# Patient Record
Sex: MALE
[De-identification: names, ages, dates, MRNs, and addresses within clinical notes are randomized per-mention and may not be internally consistent; named-entity substitution may affect disease eponyms.]

---

## 2017-03-13 PROBLEM — Z00.00 ENCOUNTER FOR PREVENTIVE HEALTH EXAMINATION: Status: ACTIVE | Noted: 2017-03-13

## 2017-04-12 ENCOUNTER — APPOINTMENT (OUTPATIENT)
Dept: ENDOCRINOLOGY | Facility: CLINIC | Age: 47
End: 2017-04-12

## 2019-04-15 ENCOUNTER — APPOINTMENT (OUTPATIENT)
Dept: COLORECTAL SURGERY | Facility: CLINIC | Age: 49
End: 2019-04-15
Payer: MEDICAID

## 2019-04-29 ENCOUNTER — APPOINTMENT (OUTPATIENT)
Dept: COLORECTAL SURGERY | Facility: CLINIC | Age: 49
End: 2019-04-29
Payer: MEDICAID

## 2019-04-29 VITALS
HEART RATE: 114 BPM | TEMPERATURE: 98.2 F | SYSTOLIC BLOOD PRESSURE: 150 MMHG | WEIGHT: 226 LBS | BODY MASS INDEX: 35.47 KG/M2 | HEIGHT: 67 IN | DIASTOLIC BLOOD PRESSURE: 96 MMHG

## 2019-04-29 DIAGNOSIS — J45.20 MILD INTERMITTENT ASTHMA, UNCOMPLICATED: ICD-10-CM

## 2019-04-29 DIAGNOSIS — K62.5 HEMORRHAGE OF ANUS AND RECTUM: ICD-10-CM

## 2019-04-29 DIAGNOSIS — I10 ESSENTIAL (PRIMARY) HYPERTENSION: ICD-10-CM

## 2019-04-29 DIAGNOSIS — J45.909 UNSPECIFIED ASTHMA, UNCOMPLICATED: ICD-10-CM

## 2019-04-29 DIAGNOSIS — Z55.0 ILLITERACY AND LOW-LEVEL LITERACY: ICD-10-CM

## 2019-04-29 DIAGNOSIS — E11.9 TYPE 2 DIABETES MELLITUS W/OUT COMPLICATIONS: ICD-10-CM

## 2019-04-29 PROCEDURE — 99214 OFFICE O/P EST MOD 30 MIN: CPT | Mod: 25

## 2019-04-29 PROCEDURE — 46600 DIAGNOSTIC ANOSCOPY SPX: CPT

## 2019-04-29 RX ORDER — METFORMIN HYDROCHLORIDE 500 MG/1
500 TABLET, COATED ORAL
Refills: 0 | Status: ACTIVE | COMMUNITY

## 2019-04-29 RX ORDER — ALBUTEROL SULFATE 90 UG/1
108 (90 BASE) AEROSOL, METERED RESPIRATORY (INHALATION)
Refills: 0 | Status: ACTIVE | COMMUNITY

## 2019-04-29 SDOH — EDUCATIONAL SECURITY - EDUCATION ATTAINMENT: ILITERACY AND LOW LEVEL LITERACY: Z55.0

## 2019-04-29 NOTE — ASSESSMENT
[FreeTextEntry1] : Advised colonoscopy for assessment of recent change in bowels with rectal bleeding.\par \par I have reviewed with the patient in detail the role of colonoscopy any evaluation of possible colon polyps and cancer. The risks, alternatives and benefits of the procedure were detailed and client including but not limited to risk of bleeding, risk of infection, risk of perforation and requiring further and potential surgery, and other secondary complications of the procedure. The colonoscopy will be performed to evaluate for possible polyps and cancer and if possible a polypectomy or removal of the polyp will be performed. Bowel preparation instructions were reviewed. The need for long-term surveillance based on findings of the colonoscopy were discussed.\par The patient consents to the planned procedure.\par \par I have reviewed with the patient the clinical and natural history of human papilloma virus and its relationship to the anus. The risks and associated consequences including sexual transmission, anal warts, anal dysplasia, and the risk of anal cancer have been outlined. The need for long-term surveillance and followup has been detailed. The treatment options including high resolution anoscopy and its associated risk of recurrence and post procedure stricture and pain compared to continued close surveillance and monitoring were reviewed. The patient wishes to proceed with surveillance and management of identified visible/palpable lesions as identified.\par \par I have personally spent 30 minutes with the patient with greater than 50% of the time counseling cord in the patient's care.\par

## 2019-04-29 NOTE — PHYSICAL EXAM
[None] : no anal fissures seen [Wart] : no warts [de-identified] : Anal pap performed [FreeTextEntry1] : A lighted anoscope was passed into the anal canal and the entire anal mucosal surface was inspected..  THe findings revealed moderate internal hemorrhoids. No masses or lesions were identified.\par \par

## 2019-04-29 NOTE — HISTORY OF PRESENT ILLNESS
[FreeTextEntry1] : 49 yo M presents for follow up\par \par Reports last seen at [facility] approx 1 year ago, reports history of hemorrhoids and follow up every 6 mo-1 year, reports has had in office excision and topical application to treat ?lesions\par Pt unsure what he was treated for in the past\par \par Complains of rectal bleeding.  No  pain\par Occasional itching\par Reports improvement with sitz baths\par BH: daily, with occasional straining and constipation\par Reports low intake dietary fiber and water intake. High intake of fast food and diabetes\par HIV (-), MSM, anal insertive sex. Denies hx of STIs\par \par Denies [facility] colorectal cancer\par Never had a colonoscopy\par No ASA/NSAIDs in last week

## 2019-05-01 LAB — ANAL PAP CYTOLOGY: NORMAL

## 2019-06-28 ENCOUNTER — APPOINTMENT (OUTPATIENT)
Dept: COLORECTAL SURGERY | Facility: HOSPITAL | Age: 49
End: 2019-06-28

## 2020-10-02 ENCOUNTER — APPOINTMENT (OUTPATIENT)
Dept: COLORECTAL SURGERY | Facility: CLINIC | Age: 50
End: 2020-10-02
Payer: MEDICAID

## 2020-10-02 VITALS
WEIGHT: 219 LBS | HEIGHT: 67 IN | DIASTOLIC BLOOD PRESSURE: 92 MMHG | TEMPERATURE: 97.1 F | HEART RATE: 114 BPM | SYSTOLIC BLOOD PRESSURE: 152 MMHG | BODY MASS INDEX: 34.37 KG/M2

## 2020-10-02 PROCEDURE — 46600 DIAGNOSTIC ANOSCOPY SPX: CPT

## 2020-10-02 PROCEDURE — 99214 OFFICE O/P EST MOD 30 MIN: CPT | Mod: 25

## 2020-10-02 NOTE — PHYSICAL EXAM
[Wart] : no warts [Normal] : was normal [None] : there was no rectal mass  [de-identified] : Anal pap performed [FreeTextEntry1] : A lighted anoscope was passed into the anal canal and the entire anal mucosal surface was inspected..  The findings revealed moderate internal hemorrhoids. No masses or lesions were identified.\par \par

## 2020-10-02 NOTE — HISTORY OF PRESENT ILLNESS
[FreeTextEntry1] : 49 y/o M presents for f/u anal dysplasia\par H/o in office fulgurations (?) completed in the past\par Last seen in the office 4/29/19, anal pap smear was (-)\par Denies pain, itching, bleeding\par Reports small bump that has been present for years\par BH:Daily\par Admits to needing to strain with every BM to the point where he sometimes feels like he may pass out. Denies syncopal episodes \par Admits to limited dietary fiber and water intake\par Using a fiber supplement once every 3-4 months\par MSM, (+) anal receptive sex\par HIV (-)\par No ASA/NSAIDs last 7 days\par \par EGD and colonoscopy completed 7/8/19 with Dr. Anderson \par - stool in the entire examined colon\par - diverticulosis in the sigmoid colon\par

## 2020-10-07 LAB — ANAL PAP CYTOLOGY: NORMAL

## 2023-11-06 ENCOUNTER — APPOINTMENT (OUTPATIENT)
Dept: COLORECTAL SURGERY | Facility: CLINIC | Age: 53
End: 2023-11-06
Payer: MEDICAID

## 2023-11-06 ENCOUNTER — NON-APPOINTMENT (OUTPATIENT)
Age: 53
End: 2023-11-06

## 2023-11-06 VITALS
SYSTOLIC BLOOD PRESSURE: 130 MMHG | WEIGHT: 218 LBS | TEMPERATURE: 97.8 F | BODY MASS INDEX: 34.21 KG/M2 | HEIGHT: 67 IN | DIASTOLIC BLOOD PRESSURE: 89 MMHG | HEART RATE: 99 BPM

## 2023-11-06 DIAGNOSIS — Z78.9 OTHER SPECIFIED HEALTH STATUS: ICD-10-CM

## 2023-11-06 DIAGNOSIS — K62.82 DYSPLASIA OF ANUS: ICD-10-CM

## 2023-11-06 PROCEDURE — 99203 OFFICE O/P NEW LOW 30 MIN: CPT | Mod: 25

## 2023-11-06 PROCEDURE — 46600 DIAGNOSTIC ANOSCOPY SPX: CPT

## 2023-11-22 ENCOUNTER — NON-APPOINTMENT (OUTPATIENT)
Age: 53
End: 2023-11-22

## 2023-11-22 LAB — ANAL PAP CYTOLOGY: NORMAL

## 2025-06-30 ENCOUNTER — EMERGENCY (EMERGENCY)
Facility: HOSPITAL | Age: 55
LOS: 1 days | End: 2025-06-30
Admitting: EMERGENCY MEDICINE
Payer: MEDICAID

## 2025-06-30 VITALS
OXYGEN SATURATION: 96 % | RESPIRATION RATE: 18 BRPM | WEIGHT: 220.02 LBS | DIASTOLIC BLOOD PRESSURE: 94 MMHG | SYSTOLIC BLOOD PRESSURE: 150 MMHG | HEART RATE: 100 BPM | TEMPERATURE: 99 F

## 2025-06-30 PROCEDURE — 99284 EMERGENCY DEPT VISIT MOD MDM: CPT | Mod: 25

## 2025-06-30 PROCEDURE — 10061 I&D ABSCESS COMP/MULTIPLE: CPT

## 2025-06-30 RX ORDER — NAPROXEN SODIUM 275 MG
500 TABLET ORAL ONCE
Refills: 0 | Status: COMPLETED | OUTPATIENT
Start: 2025-06-30 | End: 2025-06-30

## 2025-06-30 RX ADMIN — Medication 500 MILLIGRAM(S): at 22:48

## 2025-06-30 NOTE — ED PROVIDER NOTE - PATIENT PORTAL LINK FT
You can access the FollowMyHealth Patient Portal offered by [facility] by registering at the following website: [email or web address]. By joining [facility]’s FollowMyHealth portal, you will also be able to view your health information using other applications (apps) compatible with our system.

## 2025-06-30 NOTE — ED PROVIDER NOTE - PHYSICAL EXAMINATION
normal gait and station , no tenderness or deformities present Gen - WDWN M, NAD, speaking in full sentences  Skin - no acute rash, intact, no acute wound noted   HEENT - AT/NC, no conjunctival injection or dc, neck supple and FROM, airway patent   CV - S1S2, R/R/R  Resp - CTAB, no focal r/r/w   MSK - w/w/p, R 3rd toe with mild erythema and yellowish discoloration around the nail bed, TTP, no crepitus, streaking, warmth extending more proximally. NV Intact, +SILT, symmetric pulses b/l    Psych - euphoria, normal speech and eye contact, judgement/insight intact   Neuro - AxOx3, ambulatory with steady gait

## 2025-06-30 NOTE — ED PROVIDER NOTE - CLINICAL SUMMARY MEDICAL DECISION MAKING FREE TEXT BOX
pt with fluctuant tender paronychia on exam, s/p I&D at bedside, site milked, and expressed, soaked in warm water and betadine dressing applied, xray with bony erosion or suspicion of OM or deep tissue abscess, adequate hemostasis achieved s/p I&D, wound thoroughly irrigated with NS, wound c&s obtained and sent, course of NSAID and augmentin, instructed to return in 2d for wound check or sooner for any s/s of worsening infxn. strict return precautions discussed, pt verbalized understanding.

## 2025-06-30 NOTE — ED PROVIDER NOTE - OBJECTIVE STATEMENT
55 yo M with PMHx of preDM, HTN, and asthma, presenting R 3rd toe pain x 1d. Pt feels like he accidentally injured his R foot while putting his foot out to push a door and his toes hyper-extended. Noted pain and swelling to the R 3rd toe region. Denies fall, direct trauma, LOC, break in the skin, paresthesia, numbness, tingling, redness, bleeding, d/c, HA, dizziness, SOB, CP, palpitations, N/V, focal weakness, neck/back pain, and malaise. Pt has been ambulatory

## 2025-06-30 NOTE — ED ADULT TRIAGE NOTE - CHIEF COMPLAINT QUOTE
Patient endorsing right 3rd toe pain and swelling since yesterday. toe appears swollen and red. Patient ambulatory into ED.

## 2025-06-30 NOTE — ED PROVIDER NOTE - NSFOLLOWUPINSTRUCTIONS_ED_ALL_ED_FT
Discharge Instructions for Paronychia    You have been diagnosed with paronychia, which is an infection of the skin around your fingernail or toenail. This area may be red, swollen, painful, or contain pus. With the right care, most cases improve quickly.    Caring for Your Finger or Toe at Home  1. Keep It Clean and Dry    Wash the area gently with soap and water every day.  Dry the area well after washing.  2. Warm Soaks    Soak the affected finger or toe in warm water (not hot) for 15–20 minutes, 2–3 times a day. This helps reduce swelling and draws out pus.  3. Medications    Use any prescribed antibiotic ointment or medicine as directed.  If you were given oral antibiotics, take them exactly as prescribed. Finish the entire course, even if you feel better.  4. Bandaging    You may cover the area with a clean, dry bandage. Change it every day or if it becomes wet or dirty.  5. Avoid Irritation    Avoid biting or picking at the area.  Protect your hands from chemicals or further injury. Use gloves if doing household chores.  When to Get Medical Help Right Away  Call your doctor or return to the clinic/ER if you notice any of the following:    Increasing redness, pain, swelling, or pus  Spreading redness up your finger, hand, toe, or foot  Fever or chills  Trouble moving your finger or toe  No improvement after 2–3 days of treatment  Follow-Up  Keep your follow-up appointment if one was made.  If your nail was drained, your doctor may want to check the healing.  Summary:  Soak your finger or toe, use medicine as directed, and keep the area clean and protected. Watch for any signs that the infection is getting worse, and contact your healthcare provider if you have any concerns.

## 2025-06-30 NOTE — ED PROVIDER NOTE - NSICDXPASTMEDICALHX_GEN_ALL_CORE_FT
Patient calling for an update on live well message she sent over earlier. Pt would like to speak to a nurse  
PAST MEDICAL HISTORY:  Asthma     HTN (hypertension)

## 2025-06-30 NOTE — ED PROVIDER NOTE - CARE PROVIDER_API CALL
Joe Coronado  Surgery Podiatric  [address], [facility] [address]  [place] [address]  Phone: [phone]  Fax: [phone]  Follow Up Time:     your PMD,   Phone: (   )    -  Fax: (   )    -  Follow Up Time:

## 2025-07-01 LAB
GRAM STN FLD: ABNORMAL
SPECIMEN SOURCE: SIGNIFICANT CHANGE UP

## 2025-07-01 PROCEDURE — 73630 X-RAY EXAM OF FOOT: CPT | Mod: 26,RT

## 2025-07-01 RX ORDER — AMOXICILLIN AND CLAVULANATE POTASSIUM 500; 125 MG/1; MG/1
1 TABLET, FILM COATED ORAL
Qty: 14 | Refills: 0
Start: 2025-07-01 | End: 2025-07-07

## 2025-07-01 RX ORDER — AMOXICILLIN AND CLAVULANATE POTASSIUM 500; 125 MG/1; MG/1
1 TABLET, FILM COATED ORAL ONCE
Refills: 0 | Status: COMPLETED | OUTPATIENT
Start: 2025-07-01 | End: 2025-07-01

## 2025-07-01 RX ORDER — NAPROXEN SODIUM 275 MG
1 TABLET ORAL
Qty: 14 | Refills: 0
Start: 2025-07-01

## 2025-07-01 RX ORDER — BACITRACIN 500 UNIT/G
1 OINTMENT (GRAM) TOPICAL
Qty: 1 | Refills: 0
Start: 2025-07-01

## 2025-07-01 RX ORDER — ACETAMINOPHEN 500 MG/5ML
2 LIQUID (ML) ORAL
Qty: 20 | Refills: 0
Start: 2025-07-01

## 2025-07-01 RX ADMIN — AMOXICILLIN AND CLAVULANATE POTASSIUM 1 TABLET(S): 500; 125 TABLET, FILM COATED ORAL at 01:03

## 2025-07-01 NOTE — ED PROCEDURE NOTE - PROCEDURE ADDITIONAL DETAILS
toe soaked in warm water, betadine dressing applied, surgical shoe provided, wound care instructions provided

## 2025-07-03 LAB
-  CLINDAMYCIN: SIGNIFICANT CHANGE UP
-  ERYTHROMYCIN: SIGNIFICANT CHANGE UP
-  GENTAMICIN: SIGNIFICANT CHANGE UP
-  OXACILLIN: SIGNIFICANT CHANGE UP
-  PENICILLIN: SIGNIFICANT CHANGE UP
-  RIFAMPIN: SIGNIFICANT CHANGE UP
-  TETRACYCLINE: SIGNIFICANT CHANGE UP
-  TRIMETHOPRIM/SULFAMETHOXAZOLE: SIGNIFICANT CHANGE UP
-  VANCOMYCIN: SIGNIFICANT CHANGE UP
METHOD TYPE: SIGNIFICANT CHANGE UP

## 2025-07-04 DIAGNOSIS — I10 ESSENTIAL (PRIMARY) HYPERTENSION: ICD-10-CM

## 2025-07-04 DIAGNOSIS — M79.674 PAIN IN RIGHT TOE(S): ICD-10-CM

## 2025-07-04 DIAGNOSIS — J45.909 UNSPECIFIED ASTHMA, UNCOMPLICATED: ICD-10-CM

## 2025-07-04 DIAGNOSIS — Z87.891 PERSONAL HISTORY OF NICOTINE DEPENDENCE: ICD-10-CM

## 2025-07-04 DIAGNOSIS — L03.031 CELLULITIS OF RIGHT TOE: ICD-10-CM

## 2025-07-06 LAB
CULTURE RESULTS: ABNORMAL
ORGANISM # SPEC MICROSCOPIC CNT: ABNORMAL
ORGANISM # SPEC MICROSCOPIC CNT: SIGNIFICANT CHANGE UP
SPECIMEN SOURCE: SIGNIFICANT CHANGE UP